# Patient Record
Sex: FEMALE | Race: WHITE | NOT HISPANIC OR LATINO | URBAN - METROPOLITAN AREA
[De-identification: names, ages, dates, MRNs, and addresses within clinical notes are randomized per-mention and may not be internally consistent; named-entity substitution may affect disease eponyms.]

---

## 2020-07-25 ENCOUNTER — TELEPHONE ENCOUNTER (OUTPATIENT)
Dept: URBAN - METROPOLITAN AREA CLINIC 13 | Facility: CLINIC | Age: 85
End: 2020-07-25

## 2020-07-25 RX ORDER — TRAMADOL HYDROCHLORIDE 50 MG/1
TAKE 1 TABLET BY MOUTH EVERY 6 HOURS AS NEEDED FOR PAIN TABLET ORAL
Qty: 30 | Refills: 1 | OUTPATIENT
End: 2017-11-16

## 2020-07-25 RX ORDER — METOPROLOL SUCCINATE 25 MG/1
TAKE 1 TABLET DAILY TABLET, EXTENDED RELEASE ORAL
Refills: 0 | OUTPATIENT
End: 2017-02-24

## 2020-07-25 RX ORDER — AMLODIPINE BESYLATE 5 MG/1
TAKE 1 TABLET DAILY AS DIRECTED TABLET ORAL
Refills: 0 | OUTPATIENT
End: 2016-10-26

## 2020-07-25 RX ORDER — FERROUS FUMARATE AND POLYSACCHRIDE IRON COMPLEX AND FOLIC ACID 191.2; 135.9; 1; 40; 3 MG/1; MG/1; MG/1; MG/1; MG/1
TAKE 1 CAPSULE DAILY CAPSULE ORAL
Refills: 0 | OUTPATIENT
End: 2017-08-01

## 2020-07-25 RX ORDER — OXYCODONE HYDROCHLORIDE AND ACETAMINOPHEN 10; 325 MG/1; MG/1
TAKE 1 TABLET DAILY PRN TABLET ORAL
Refills: 0 | OUTPATIENT
End: 2017-08-01

## 2020-07-25 RX ORDER — ALENDRONATE SODIUM 70 MG
TAKE 1 TABLET ONCE WEEKLY TABLET ORAL
Refills: 0 | OUTPATIENT
End: 2016-04-14

## 2020-07-25 RX ORDER — METOPROLOL TARTRATE 75 MG/1
TAKE 1 TABLET DAILY TABLET, FILM COATED ORAL
Refills: 0 | OUTPATIENT
End: 2017-08-01

## 2020-07-25 RX ORDER — ACETAMINOPHEN 500 MG/1
TAKE 1 TABLET EVERY 4 TO 6 HOURS AS NEEDED. TDD:650MG TABLET ORAL
Refills: 0 | OUTPATIENT
End: 2017-11-16

## 2020-07-25 RX ORDER — POLYETHYLENE GLYCOL 3350, SODIUM CHLORIDE, SODIUM BICARBONATE AND POTASSIUM CHLORIDE WITH LEMON FLAVOR 420; 11.2; 5.72; 1.48 G/4L; G/4L; G/4L; G/4L
USE AS DIRECTED POWDER, FOR SOLUTION ORAL
Qty: 1 | Refills: 0 | OUTPATIENT
Start: 2016-08-30 | End: 2016-10-26

## 2020-07-25 RX ORDER — PANTOPRAZOLE SODIUM 40 MG/1
TAKE 1 TABLET 30 MINUTES BEFORE BREAKFAST DAILY TABLET, DELAYED RELEASE ORAL
Qty: 30 | Refills: 11 | OUTPATIENT
Start: 2016-02-12 | End: 2016-08-30

## 2020-07-26 ENCOUNTER — TELEPHONE ENCOUNTER (OUTPATIENT)
Dept: URBAN - METROPOLITAN AREA CLINIC 13 | Facility: CLINIC | Age: 85
End: 2020-07-26

## 2020-07-26 RX ORDER — DIPHENOXYLATE HYDROCHLORIDE AND ATROPINE SULFATE 2.5; .025 MG/1; MG/1
TAKE ONE TO TWO TABLETS BY MOUTH EVERY 6 HOURS AS NEEDED FOR DIARRHEA TABLET ORAL
Qty: 45 | Refills: 3 | Status: ACTIVE | COMMUNITY
Start: 2017-08-01

## 2020-07-26 RX ORDER — TEMAZEPAM 15 MG/1
TAKE 1 CAPSULE AT BEDTIME AS NEEDED CAPSULE ORAL
Refills: 0 | Status: ACTIVE | COMMUNITY

## 2020-07-26 RX ORDER — DICYCLOMINE HYDROCHLORIDE 10 MG/1
TAKE 1 CAPSULE EVERY 6 HOURS PRN ABDOMINAL PAIN CAPSULE ORAL
Qty: 45 | Refills: 3 | Status: ACTIVE | COMMUNITY
Start: 2017-11-16

## 2020-07-26 RX ORDER — CHLORTHALIDONE 25 MG/1
TAKE 1 TABLET DAILY TABLET ORAL
Refills: 0 | Status: ACTIVE | COMMUNITY

## 2020-07-26 RX ORDER — TRAMADOL HYDROCHLORIDE 50 MG/1
TAKE 1 TABLET DAILY PRN TABLET ORAL
Refills: 0 | Status: ACTIVE | COMMUNITY

## 2020-07-26 RX ORDER — HYDRALAZINE HYDROCHLORIDE 50 MG/1
TAKE 1 TABLET 3 TIMES DAILY TABLET ORAL
Refills: 0 | Status: ACTIVE | COMMUNITY

## 2020-07-26 RX ORDER — PANTOPRAZOLE SODIUM 40 MG/1
TAKE 1 TABLET DAILY TABLET, DELAYED RELEASE ORAL
Qty: 30 | Refills: 11 | Status: ACTIVE | COMMUNITY
Start: 2020-04-14

## 2020-07-26 RX ORDER — METOPROLOL SUCCINATE 50 MG/1
TAKE 1.5 TABLET DAILY TABLET, EXTENDED RELEASE ORAL
Refills: 0 | Status: ACTIVE | COMMUNITY

## 2020-07-26 RX ORDER — LISINOPRIL 40 MG/1
TAKE 1 TABLET DAILY AS DIRECTED TABLET ORAL
Refills: 0 | Status: ACTIVE | COMMUNITY

## 2021-07-21 ENCOUNTER — WEB ENCOUNTER (OUTPATIENT)
Dept: URBAN - METROPOLITAN AREA CLINIC 107 | Facility: CLINIC | Age: 86
End: 2021-07-21

## 2021-07-21 ENCOUNTER — OFFICE VISIT (OUTPATIENT)
Dept: URBAN - METROPOLITAN AREA CLINIC 107 | Facility: CLINIC | Age: 86
End: 2021-07-21
Payer: MEDICARE

## 2021-07-21 VITALS
WEIGHT: 71 LBS | BODY MASS INDEX: 13.07 KG/M2 | RESPIRATION RATE: 24 BRPM | HEART RATE: 77 BPM | SYSTOLIC BLOOD PRESSURE: 176 MMHG | DIASTOLIC BLOOD PRESSURE: 79 MMHG | TEMPERATURE: 98.5 F | HEIGHT: 62 IN

## 2021-07-21 DIAGNOSIS — R13.19 ESOPHAGEAL DYSPHAGIA: ICD-10-CM

## 2021-07-21 DIAGNOSIS — K58.0 IRRITABLE BOWEL SYNDROME WITH DIARRHEA: ICD-10-CM

## 2021-07-21 DIAGNOSIS — Z85.038 HISTORY OF COLON CANCER: ICD-10-CM

## 2021-07-21 DIAGNOSIS — K22.4 ESOPHAGEAL DYSMOTILITY: ICD-10-CM

## 2021-07-21 DIAGNOSIS — R63.4 WEIGHT LOSS: ICD-10-CM

## 2021-07-21 PROBLEM — 429699009: Status: ACTIVE | Noted: 2021-07-21

## 2021-07-21 PROBLEM — 266434009: Status: ACTIVE | Noted: 2021-07-21

## 2021-07-21 PROBLEM — 40890009: Status: ACTIVE | Noted: 2021-07-21

## 2021-07-21 PROBLEM — 89362005: Status: ACTIVE | Noted: 2021-07-21

## 2021-07-21 PROBLEM — 197125005: Status: ACTIVE | Noted: 2021-07-21

## 2021-07-21 PROCEDURE — 99204 OFFICE O/P NEW MOD 45 MIN: CPT | Performed by: INTERNAL MEDICINE

## 2021-07-21 RX ORDER — TRAMADOL HYDROCHLORIDE 50 MG/1
TAKE 1 TABLET DAILY PRN TABLET ORAL
Refills: 0 | Status: ON HOLD | COMMUNITY

## 2021-07-21 RX ORDER — HYDRALAZINE HYDROCHLORIDE 50 MG/1
TAKE 1 TABLET 3 TIMES DAILY TABLET ORAL
Refills: 0 | Status: ON HOLD | COMMUNITY

## 2021-07-21 RX ORDER — DICYCLOMINE HYDROCHLORIDE 10 MG/1
TAKE 1 CAPSULE EVERY 6 HOURS PRN ABDOMINAL PAIN CAPSULE ORAL
Qty: 45 | Refills: 3 | Status: ON HOLD | COMMUNITY
Start: 2017-11-16

## 2021-07-21 RX ORDER — LISINOPRIL 40 MG/1
TAKE 1 TABLET DAILY AS DIRECTED TABLET ORAL
Refills: 0 | Status: ON HOLD | COMMUNITY

## 2021-07-21 RX ORDER — TEMAZEPAM 15 MG/1
TAKE 1 CAPSULE AT BEDTIME AS NEEDED CAPSULE ORAL
Refills: 0 | Status: ON HOLD | COMMUNITY

## 2021-07-21 RX ORDER — PANTOPRAZOLE SODIUM 40 MG/1
TAKE 1 TABLET DAILY TABLET, DELAYED RELEASE ORAL
Qty: 30 | Refills: 11 | Status: ACTIVE | COMMUNITY
Start: 2020-04-14

## 2021-07-21 RX ORDER — CHLORTHALIDONE 25 MG/1
TAKE 1 TABLET DAILY TABLET ORAL
Refills: 0 | Status: ON HOLD | COMMUNITY

## 2021-07-21 RX ORDER — METOPROLOL SUCCINATE 25 MG/1
TAKE 1.5 TABLET DAILY TABLET, FILM COATED, EXTENDED RELEASE ORAL
Refills: 0 | Status: ACTIVE | COMMUNITY

## 2021-07-21 RX ORDER — DIPHENOXYLATE HYDROCHLORIDE AND ATROPINE SULFATE 2.5; .025 MG/1; MG/1
TAKE ONE TO TWO TABLETS BY MOUTH EVERY 6 HOURS AS NEEDED FOR DIARRHEA TABLET ORAL
Qty: 45 | Refills: 3 | Status: ON HOLD | COMMUNITY
Start: 2017-08-01

## 2021-07-21 NOTE — HPI-TODAY'S VISIT:
Ms. Hare is an 89-year-old female referred from Dr. Loja for evaluation of difficulty swallowing.  She was last in the office in 2017 with Dr. Lujan mainly for IBS and personal history of colon cancer. 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  She has been having increased swallowing difficulties for the last month or more.  She has the sensation of something is stuck on the left side of her neck.  It is mainly with pills and sometimes with solid foods.  If she puts her pills in food then she can swallow it.  She has been mainly doing a full liquid diet and ensure.  She has severe emphysema which is at times incapacitating.  She is not currently on Oxygen.  She was previously followed by Dr. Champagne who retired has not seen any pulmonologists since.  She has difficulty breathing which she thinks is playing a role in her swallowing.  She has had some weight loss due to inabiliity to eat much solid food.  She otherwise denies abdominal pain, chest pain, nausea, vomiting, reflux, change in bowel habits or blood in the stool.  She lives alone and does not family close by.  Her daughter is at the office since today who lives in [place].  They are discussing different options for her mother.  She does have trouble masticating food because she does not have all of her teeth.   Prior EGD April 2016 revealed a nonobstructing Schatzki's ring, moderate hiatal hernia, Los's erosions, diffuse hemorrhagic moderate gastritis and normal duodenum.

## 2021-08-11 ENCOUNTER — CLAIMS CREATED FROM THE CLAIM WINDOW (OUTPATIENT)
Dept: URBAN - METROPOLITAN AREA MEDICAL CENTER 43 | Facility: MEDICAL CENTER | Age: 86
End: 2021-08-11
Payer: MEDICARE

## 2021-08-11 DIAGNOSIS — R13.19 OTHER DYSPHAGIA: ICD-10-CM

## 2021-08-11 DIAGNOSIS — Z99.81 DEPENDENCE ON SUPPLEMENTAL OXYGEN: ICD-10-CM

## 2021-08-11 DIAGNOSIS — J44.9 CHRONIC OBSTRUCTIVE PULMONARY DISEASE, UNSPECIFIED: ICD-10-CM

## 2021-08-11 DIAGNOSIS — R54 AGE-RELATED PHYSICAL DEBILITY: ICD-10-CM

## 2021-08-11 PROCEDURE — 99222 1ST HOSP IP/OBS MODERATE 55: CPT | Performed by: INTERNAL MEDICINE

## 2021-08-12 ENCOUNTER — CLAIMS CREATED FROM THE CLAIM WINDOW (OUTPATIENT)
Dept: URBAN - METROPOLITAN AREA MEDICAL CENTER 43 | Facility: MEDICAL CENTER | Age: 86
End: 2021-08-12
Payer: MEDICARE

## 2021-08-12 DIAGNOSIS — K59.01 SLOW TRANSIT CONSTIPATION: ICD-10-CM

## 2021-08-12 DIAGNOSIS — R54 AGE-RELATED PHYSICAL DEBILITY: ICD-10-CM

## 2021-08-12 DIAGNOSIS — R13.19 OTHER DYSPHAGIA: ICD-10-CM

## 2021-08-12 PROCEDURE — 99232 SBSQ HOSP IP/OBS MODERATE 35: CPT | Performed by: INTERNAL MEDICINE

## 2021-11-29 ENCOUNTER — DASHBOARD ENCOUNTERS (OUTPATIENT)
Age: 86
End: 2021-11-29

## 2021-11-29 ENCOUNTER — OFFICE VISIT (OUTPATIENT)
Dept: URBAN - METROPOLITAN AREA CLINIC 113 | Facility: CLINIC | Age: 86
End: 2021-11-29

## 2021-11-29 RX ORDER — LISINOPRIL 40 MG/1
TAKE 1 TABLET DAILY AS DIRECTED TABLET ORAL
Refills: 0 | Status: ON HOLD | COMMUNITY

## 2021-11-29 RX ORDER — PANTOPRAZOLE SODIUM 40 MG/1
TAKE 1 TABLET DAILY TABLET, DELAYED RELEASE ORAL
Qty: 30 | Refills: 11 | Status: ACTIVE | COMMUNITY
Start: 2020-04-14

## 2021-11-29 RX ORDER — METOPROLOL SUCCINATE 25 MG/1
TAKE 1.5 TABLET DAILY TABLET, FILM COATED, EXTENDED RELEASE ORAL
Refills: 0 | Status: ACTIVE | COMMUNITY

## 2021-11-29 RX ORDER — DIPHENOXYLATE HYDROCHLORIDE AND ATROPINE SULFATE 2.5; .025 MG/1; MG/1
TAKE ONE TO TWO TABLETS BY MOUTH EVERY 6 HOURS AS NEEDED FOR DIARRHEA TABLET ORAL
Qty: 45 | Refills: 3 | Status: ON HOLD | COMMUNITY
Start: 2017-08-01

## 2021-11-29 RX ORDER — CHLORTHALIDONE 25 MG/1
TAKE 1 TABLET DAILY TABLET ORAL
Refills: 0 | Status: ON HOLD | COMMUNITY

## 2021-11-29 RX ORDER — TEMAZEPAM 15 MG/1
TAKE 1 CAPSULE AT BEDTIME AS NEEDED CAPSULE ORAL
Refills: 0 | Status: ON HOLD | COMMUNITY

## 2021-11-29 RX ORDER — DICYCLOMINE HYDROCHLORIDE 10 MG/1
TAKE 1 CAPSULE EVERY 6 HOURS PRN ABDOMINAL PAIN CAPSULE ORAL
Qty: 45 | Refills: 3 | Status: ON HOLD | COMMUNITY
Start: 2017-11-16

## 2021-11-29 RX ORDER — TRAMADOL HYDROCHLORIDE 50 MG/1
TAKE 1 TABLET DAILY PRN TABLET ORAL
Refills: 0 | Status: ON HOLD | COMMUNITY

## 2021-11-29 RX ORDER — HYDRALAZINE HYDROCHLORIDE 50 MG/1
TAKE 1 TABLET 3 TIMES DAILY TABLET ORAL
Refills: 0 | Status: ON HOLD | COMMUNITY